# Patient Record
Sex: FEMALE | Race: WHITE | NOT HISPANIC OR LATINO | Employment: FULL TIME | URBAN - METROPOLITAN AREA
[De-identification: names, ages, dates, MRNs, and addresses within clinical notes are randomized per-mention and may not be internally consistent; named-entity substitution may affect disease eponyms.]

---

## 2024-06-10 DIAGNOSIS — Z34.91 FIRST TRIMESTER PREGNANCY: Primary | ICD-10-CM

## 2024-07-01 ENCOUNTER — PROCEDURE VISIT (OUTPATIENT)
Dept: OBSTETRICS AND GYNECOLOGY | Facility: CLINIC | Age: 29
End: 2024-07-01
Payer: COMMERCIAL

## 2024-07-01 DIAGNOSIS — Z34.91 FIRST TRIMESTER PREGNANCY: ICD-10-CM

## 2024-07-01 PROCEDURE — 76801 OB US < 14 WKS SINGLE FETUS: CPT | Mod: S$GLB,,, | Performed by: STUDENT IN AN ORGANIZED HEALTH CARE EDUCATION/TRAINING PROGRAM

## 2024-07-01 RX ORDER — HYDROXYCHLOROQUINE SULFATE 200 MG/1
TABLET, FILM COATED ORAL
COMMUNITY

## 2024-07-01 RX ORDER — HYDROCHLOROTHIAZIDE 25 MG/1
TABLET ORAL
COMMUNITY

## 2024-07-01 RX ORDER — BELIMUMAB 200 MG/ML
SOLUTION SUBCUTANEOUS
COMMUNITY

## 2024-07-22 ENCOUNTER — INITIAL PRENATAL (OUTPATIENT)
Dept: OBSTETRICS AND GYNECOLOGY | Facility: CLINIC | Age: 29
End: 2024-07-22
Payer: COMMERCIAL

## 2024-07-22 VITALS — HEART RATE: 98 BPM | SYSTOLIC BLOOD PRESSURE: 125 MMHG | WEIGHT: 173 LBS | DIASTOLIC BLOOD PRESSURE: 70 MMHG

## 2024-07-22 DIAGNOSIS — Z3A.11 11 WEEKS GESTATION OF PREGNANCY: ICD-10-CM

## 2024-07-22 DIAGNOSIS — O09.91 SUPERVISION OF HIGH RISK PREGNANCY IN FIRST TRIMESTER: Primary | ICD-10-CM

## 2024-07-22 PROBLEM — M32.9 SYSTEMIC LUPUS ERYTHEMATOSUS: Status: ACTIVE | Noted: 2023-10-05

## 2024-07-22 NOTE — PROGRESS NOTES
CC: Initial OB visit    HPI:  28 y.o. at 11w0d with EDC of 2/10/2025, by 8w Ultrasound.  Complains of nothing today.    ROS:  Negative unless mentioned above    PMH: Lupus  PSH: arm surgery, dental surgery  OB Hx:   SOC: denies S/E/D   FH: FOB sister - CL, denies family history of congenital defects, mental retardation, twins, cystic fibrosis, Down's syndrome, sickle cell, NTD's, cleft palate, cardiac defects, abdominal wall defects, GYN cancers   GYN Hx: no past history STD's,  Negative History of HSV,  Negative   History of Abnormal PAP  Review of patient's allergies indicates:   Allergen Reactions    Bananas [banana]     Cashew nut     Cucumber (cucumis sativus)     Melon      Current Outpatient Medications   Medication Instructions    BENLYSTA 200 mg/mL AtIn     hydroCHLOROthiazide (HYDRODIURIL) 25 MG tablet     hydroxychloroquine (PLAQUENIL) 200 mg tablet      PHYSICAL EXAM  Prenatal Vitals  BP: 125/70  Weight: 78.5 kg (173 lb)    There is no height or weight on file to calculate BMI.    Wt Readings from Last 3 Encounters:   24 78.5 kg (173 lb)     General: NAD, well developed, well nourished  Psych: alert and oriented to person, time and place, normal affect  HEENT: normocephalic, atraumatic  Gravid, soft, NT  Skin: warm, dry  Neuro: normal gait, gross motor function intact  Pelvic: NEFG. Normal vaginal mucosa, pink/ruggated, no lesions or discharge. Cvx closed, nonfriable  No cyanosis, clubbing or edema    PNL reviewed: wnl     ASSESSMENT: Patient is a 28 y.o.  at 11w0d with  Patient Active Problem List   Diagnosis    Supervision of high risk pregnancy in first trimester    Systemic lupus erythematosus       PLAN:  NIPT today, AFP on RTC  Pt with h/o lupus sees rheumatology   PNL - reviewed, GC/CZ, PAP - utd  PNV, Iron  Pain, Fever, Bleeding Precautions  ADAN, ARIEL, PreE precautions  Encouraged Breast feeding  F/U in 4 weeks

## 2024-08-07 ENCOUNTER — PATIENT MESSAGE (OUTPATIENT)
Dept: OBSTETRICS AND GYNECOLOGY | Facility: CLINIC | Age: 29
End: 2024-08-07
Payer: COMMERCIAL

## 2024-08-19 ENCOUNTER — TELEPHONE (OUTPATIENT)
Dept: OBSTETRICS AND GYNECOLOGY | Facility: CLINIC | Age: 29
End: 2024-08-19

## 2024-08-19 ENCOUNTER — ROUTINE PRENATAL (OUTPATIENT)
Dept: OBSTETRICS AND GYNECOLOGY | Facility: CLINIC | Age: 29
End: 2024-08-19
Payer: COMMERCIAL

## 2024-08-19 VITALS — WEIGHT: 178 LBS | DIASTOLIC BLOOD PRESSURE: 77 MMHG | SYSTOLIC BLOOD PRESSURE: 136 MMHG | HEART RATE: 86 BPM

## 2024-08-19 DIAGNOSIS — Z36.1 NEED FOR MATERNAL SERUM ALPHA-PROTEIN (MSAFP) SCREENING: ICD-10-CM

## 2024-08-19 DIAGNOSIS — Z3A.15 15 WEEKS GESTATION OF PREGNANCY: ICD-10-CM

## 2024-08-19 DIAGNOSIS — O09.92 SUPERVISION OF HIGH RISK PREGNANCY IN SECOND TRIMESTER: Primary | ICD-10-CM

## 2024-08-19 NOTE — TELEPHONE ENCOUNTER
Orders have been sent over.     ----- Message from Eva Cornejo sent at 8/19/2024  2:32 PM CDT -----  Regarding: orders for bloodwork  Name of who is calling:   Myra / [facility]ology [facility]      What is the request in detail: Requesting a call back asap due to needing orders for blood work / pt is in office now / please fax to [phone]      Can the clinic reply by [facility]:      What number to call back if not [facility]:[phone]

## 2024-08-19 NOTE — PROGRESS NOTES
CC: Follow-Up OB    HPI:   28 y.o.  at 15w0d with EDC of 2/10/2025, by Ultrasound, here for her follow up OB visit. Complains of nothing today. Nausea is improving.    Pregnancy ROS:  Negative leakage of fluid   Negative vaginal bleeding   Negative headache, vision changes, RUQ pain, epigastric pain  Negative contractions/abdominal pain   Negative pelvic pressure     Physical Exam:  Prenatal Vitals  BP: 136/77  Weight: 80.7 kg (178 lb)    Wt Readings from Last 3 Encounters:   24 80.7 kg (178 lb)   24 78.5 kg (173 lb)       There is no height or weight on file to calculate BMI.    General: NAD, well developed, well nourished  Psych: alert and oriented to person, time and place, normal affect  HEENT: normocephalic, atraumatic  Abd: Gravid, soft, NT, ND  Skin: warm, dry  Neuro: normal gait, gross motor function intact  No cyanosis, clubbing or edema    FHTs: +    ASSESSMENT: 28 y.o.  @ 15w0d with   Patient Active Problem List   Diagnosis    Supervision of high risk pregnancy in first trimester    Systemic lupus erythematosus       PLAN:  NIPT negative  AFP today  Continue ASA daily  Pain, fever, bleeding precautions  ADAN, ARIEL, PreE precautions  Cont PNV, Iron  Return to clinic in 4 weeks

## 2024-08-22 LAB
AFP MOM: 1.02
AFP, SERUM: 26.7 NG/ML
CALC'D GESTATIONAL AGE: 15 WEEKS
COLLECTION DATE: NORMAL
COMMENT: NORMAL
DATE OF BIRTH: NORMAL
DONOR AGE: EGG RETRIEVAL: NORMAL
DONOR EGG: NO
EDD DETERMINED BY: NORMAL
EST'D DATE OF DELIVERY: NORMAL
HX OF NEURAL TUBE DEFECTS: NO
INSULIN DEPEND DIABETIC: NO
INTERPRETATION: NORMAL
Lab: NORMAL
MATERNAL WEIGHT: 178 LBS
MOTHER'S ETHNIC ORIGIN: NORMAL
NUMBER OF FETUSES: 1
PREV PREGNANCY DOWN SYND: NO
REPEAT SPECIMEN: NO
RISK FOR ONTD: NORMAL

## 2024-08-26 ENCOUNTER — PATIENT MESSAGE (OUTPATIENT)
Dept: OTHER | Facility: OTHER | Age: 29
End: 2024-08-26
Payer: COMMERCIAL

## 2024-09-02 ENCOUNTER — PATIENT MESSAGE (OUTPATIENT)
Dept: OTHER | Facility: OTHER | Age: 29
End: 2024-09-02
Payer: COMMERCIAL

## 2024-09-16 ENCOUNTER — ROUTINE PRENATAL (OUTPATIENT)
Dept: OBSTETRICS AND GYNECOLOGY | Facility: CLINIC | Age: 29
End: 2024-09-16
Payer: COMMERCIAL

## 2024-09-16 VITALS — SYSTOLIC BLOOD PRESSURE: 146 MMHG | HEART RATE: 84 BPM | WEIGHT: 183 LBS | DIASTOLIC BLOOD PRESSURE: 80 MMHG

## 2024-09-16 DIAGNOSIS — O16.2 ELEVATED BLOOD PRESSURE AFFECTING PREGNANCY IN SECOND TRIMESTER, ANTEPARTUM: ICD-10-CM

## 2024-09-16 DIAGNOSIS — O09.92 SUPERVISION OF HIGH RISK PREGNANCY IN SECOND TRIMESTER: Primary | ICD-10-CM

## 2024-09-16 DIAGNOSIS — Z3A.19 19 WEEKS GESTATION OF PREGNANCY: ICD-10-CM

## 2024-09-16 NOTE — PROGRESS NOTES
CC: Follow-Up OB    HPI:   28 y.o.  at 19w0d with EDC of 2/10/2025, by Ultrasound, here for her follow up OB visit. Complains of nothing today. BP was elevated in clinic today x 3.    Pregnancy ROS:  Negative leakage of fluid   Negative vaginal bleeding   Negative headache, vision changes, RUQ pain, epigastric pain  Negative contractions/abdominal pain   Negative pelvic pressure     Physical Exam:  Prenatal Vitals  BP: (!) 146/80  Weight: 83 kg (183 lb)  Urine Glucose: Negative  Urine Albumin: Negative    Wt Readings from Last 3 Encounters:   24 83 kg (183 lb)   24 80.7 kg (178 lb)   24 78.5 kg (173 lb)     There is no height or weight on file to calculate BMI.    General: NAD, well developed, well nourished  Psych: alert and oriented to person, time and place, normal affect  HEENT: normocephalic, atraumatic  Abd: Gravid, soft, NT, ND  Skin: warm, dry  Neuro: normal gait, gross motor function intact  No cyanosis, clubbing or edema    FHTs: +      ASSESSMENT: 28 y.o.  @ 19w0d with   Patient Active Problem List   Diagnosis    Supervision of high risk pregnancy in first trimester    Systemic lupus erythematosus     PLAN:  AFP negative  Continue ASA daily  Elevated BP today, will get baseline PreE labs today  Pt to return next week for BP check, if still elevated discussed needing to starts medication  Pain, fever, bleeding precautions  ADAN, ARIEL, PreE precautions  Cont PNV, Iron  Return to clinic in 1 weeks

## 2024-09-17 LAB
ABS NRBC COUNT: 0 X 10 3/UL (ref 0–0.01)
ABSOLUTE BASOPHIL: 0.06 X 10 3/UL (ref 0–0.22)
ABSOLUTE EOSINOPHIL: 0.29 X 10 3/UL (ref 0.04–0.54)
ABSOLUTE IMMATURE GRAN: 0.19 X 10 3/UL (ref 0–0.04)
ABSOLUTE LYMPHOCYTE: 1.63 X 10 3/UL (ref 0.86–4.75)
ABSOLUTE MONOCYTE: 1.03 X 10 3/UL (ref 0.22–1.08)
ALBUMIN SERPL-MCNC: 3.8 G/DL (ref 3.5–5.2)
ALBUMIN/GLOB SERPL ELPH: 1.5 {RATIO} (ref 1–2.7)
ALP ISOS SERPL LEV INH-CCNC: 48 U/L (ref 35–105)
ALT (SGPT): 20 U/L (ref 0–33)
ANION GAP SERPL CALC-SCNC: 14 MMOL/L (ref 8–17)
AST SERPL-CCNC: 22 U/L (ref 0–32)
BASOPHILS NFR BLD: 0.5 % (ref 0.2–1.2)
BILIRUBIN, TOTAL: 0.17 MG/DL (ref 0–1.2)
BLOOD GROUPING: NORMAL
BLOOD TYPE (D): POSITIVE
BUN/CREAT SERPL: 11.1 (ref 6–20)
CALCIUM SERPL-MCNC: 8.8 MG/DL (ref 8.6–10.2)
CARBON DIOXIDE, CO2: 21 MMOL/L (ref 22–29)
CHLORIDE: 104 MMOL/L (ref 98–107)
CREAT SERPL-MCNC: 0.62 MG/DL (ref 0.5–0.9)
EOSINOPHIL NFR BLD: 2.2 % (ref 0.7–7)
GFR ESTIMATION: 125.1 ML/MIN/1.73M2
GLOBULIN: 2.6 G/DL (ref 1.5–4.5)
GLUCOSE: 95 MG/DL (ref 74–106)
HCT VFR BLD AUTO: 39 % (ref 37–47)
HGB BLD-MCNC: 13.3 G/DL (ref 12–16)
IMMATURE GRANULOCYTES: 1.5 % (ref 0–0.5)
LDH SERPL L TO P-CCNC: 192 U/L (ref 135–214)
LYMPHOCYTES NFR BLD: 12.6 % (ref 19.3–53.1)
MCH RBC QN AUTO: 31 PG (ref 27–32)
MCHC RBC AUTO-ENTMCNC: 34.1 G/DL (ref 32–36)
MCV RBC AUTO: 90.9 FL (ref 82–100)
MONOCYTES NFR BLD: 8 % (ref 4.7–12.5)
NEUTROPHILS # BLD AUTO: 9.71 X 10 3/UL (ref 2.15–7.56)
NEUTROPHILS NFR BLD: 75.2 % (ref 34–71.1)
NUCLEATED RED BLOOD CELLS: 0 /100 WBC (ref 0–0.2)
PLATELET # BLD AUTO: 210 X 10 3/UL (ref 135–400)
POTASSIUM: 4.3 MMOL/L (ref 3.5–5.1)
PROT SNV-MCNC: 6.4 G/DL (ref 6.4–8.3)
RBC # BLD AUTO: 4.29 X 10 6/UL (ref 4.2–5.4)
RDW-SD: 43.5 FL (ref 37–54)
SODIUM: 139 MMOL/L (ref 136–145)
URATE SERPL-MCNC: 3.1 MG/DL (ref 2.4–5.7)
UREA NITROGEN (BUN): 6.9 MG/DL (ref 6–20)
WBC # BLD: 12.91 X 10 3/UL (ref 4.3–10.8)

## 2024-09-23 ENCOUNTER — PATIENT MESSAGE (OUTPATIENT)
Dept: OTHER | Facility: OTHER | Age: 29
End: 2024-09-23
Payer: COMMERCIAL

## 2024-09-27 ENCOUNTER — ROUTINE PRENATAL (OUTPATIENT)
Dept: OBSTETRICS AND GYNECOLOGY | Facility: CLINIC | Age: 29
End: 2024-09-27
Payer: COMMERCIAL

## 2024-09-27 VITALS — HEART RATE: 71 BPM | DIASTOLIC BLOOD PRESSURE: 84 MMHG | SYSTOLIC BLOOD PRESSURE: 140 MMHG | WEIGHT: 186 LBS

## 2024-09-27 DIAGNOSIS — Z3A.20 20 WEEKS GESTATION OF PREGNANCY: ICD-10-CM

## 2024-09-27 DIAGNOSIS — O09.92 SUPERVISION OF HIGH RISK PREGNANCY IN SECOND TRIMESTER: Primary | ICD-10-CM

## 2024-09-27 NOTE — PROGRESS NOTES
CC: Follow-Up OB    HPI:   28 y.o.  at 20w4d with EDC of 2/10/2025, by Ultrasound, here for her follow up OB visit. Complains of nothing today.    Pregnancy ROS:  Positive fetal movement   Negative leakage of fluid   Negative vaginal bleeding   Negative headache, vision changes, RUQ pain, epigastric pain  Negative contractions/abdominal pain   Negative pelvic pressure     Physical Exam:  Prenatal Vitals  BP: (!) 140/84  Weight: 84.4 kg (186 lb)  Urine Glucose: Negative  Urine Albumin: Negative    Wt Readings from Last 3 Encounters:   24 84.4 kg (186 lb)   24 83 kg (183 lb)   24 80.7 kg (178 lb)       There is no height or weight on file to calculate BMI.    General: NAD, well developed, well nourished  Psych: alert and oriented to person, time and place, normal affect  HEENT: normocephalic, atraumatic  Abd: Gravid, soft, NT, ND  Skin: warm, dry  Neuro: normal gait, gross motor function intact  No cyanosis, clubbing or edema    FHTs: +    Maternal Blood Type: O+/-    ASSESSMENT: 28 y.o.  @ 20w4d with   Patient Active Problem List   Diagnosis    Supervision of high risk pregnancy in first trimester    Systemic lupus erythematosus     PLAN:  Continue ASA daily   Pt to have anatomy scan with MFM  BP elevated again today in clinic however pt is checking her BP at home, all values wnl, no elevated readings at home  Pt likely with white coat HTN  Pain, fever, bleeding precautions  ADAN, ARIEL, PreE precautions  Cont PNV, Iron  Return to clinic in 3 weeks

## 2024-09-30 ENCOUNTER — PATIENT MESSAGE (OUTPATIENT)
Dept: OBSTETRICS AND GYNECOLOGY | Facility: CLINIC | Age: 29
End: 2024-09-30
Payer: COMMERCIAL

## 2024-09-30 DIAGNOSIS — R30.0 DYSURIA: Primary | ICD-10-CM

## 2024-10-04 LAB — URINE CULTURE, ROUTINE: NORMAL

## 2024-10-11 DIAGNOSIS — Z36.89 ENCOUNTER FOR FETAL ANATOMIC SURVEY: Primary | ICD-10-CM

## 2024-10-14 ENCOUNTER — ROUTINE PRENATAL (OUTPATIENT)
Dept: OBSTETRICS AND GYNECOLOGY | Facility: CLINIC | Age: 29
End: 2024-10-14
Payer: COMMERCIAL

## 2024-10-14 ENCOUNTER — PROCEDURE VISIT (OUTPATIENT)
Dept: OBSTETRICS AND GYNECOLOGY | Facility: CLINIC | Age: 29
End: 2024-10-14
Payer: COMMERCIAL

## 2024-10-14 VITALS — DIASTOLIC BLOOD PRESSURE: 82 MMHG | SYSTOLIC BLOOD PRESSURE: 154 MMHG | WEIGHT: 187.63 LBS | HEART RATE: 98 BPM

## 2024-10-14 DIAGNOSIS — O09.92 SUPERVISION OF HIGH RISK PREGNANCY IN SECOND TRIMESTER: Primary | ICD-10-CM

## 2024-10-14 DIAGNOSIS — Z36.89 ENCOUNTER FOR FETAL ANATOMIC SURVEY: Primary | ICD-10-CM

## 2024-10-14 DIAGNOSIS — Z3A.23 23 WEEKS GESTATION OF PREGNANCY: ICD-10-CM

## 2024-10-14 DIAGNOSIS — O09.92 HIGH-RISK PREGNANCY IN SECOND TRIMESTER: Primary | ICD-10-CM

## 2024-10-14 NOTE — PROGRESS NOTES
CC: Follow-Up OB    HPI:   28 y.o.  at 23w0d with EDC of 2/10/2025, by Ultrasound, here for her follow up OB visit. Complains of nothing today.    Pregnancy ROS:  Positive fetal movement   Negative leakage of fluid   Negative vaginal bleeding   Negative headache, vision changes, RUQ pain, epigastric pain  Negative contractions/abdominal pain   Negative pelvic pressure     Physical Exam:  Prenatal Vitals  BP: (!) 154/82  Weight: 85.1 kg (187 lb 9.6 oz)  Urine Glucose: Negative  Urine Albumin: Negative    Wt Readings from Last 3 Encounters:   10/14/24 85.1 kg (187 lb 9.6 oz)   24 84.4 kg (186 lb)   24 83 kg (183 lb)       There is no height or weight on file to calculate BMI.    General: NAD, well developed, well nourished  Psych: alert and oriented to person, time and place, normal affect  HEENT: normocephalic, atraumatic  Abd: Gravid, soft, NT, ND  Skin: warm, dry  Neuro: normal gait, gross motor function intact  No cyanosis, clubbing or edema    FHTs: + on US    Maternal Blood Type: O+/-    ASSESSMENT: 28 y.o.  @ 23w0d with   Patient Active Problem List   Diagnosis    Supervision of high risk pregnancy in first trimester    Systemic lupus erythematosus     PLAN:  Pt with white coat HTN, she did bring her BP readings from home, all values wnl, most in 120s systolic  Anatomy scan today  Osull on RTC  Pain, fever, bleeding precautions  ADAN, ARIEL, PreE precautions  Cont PNV, Iron  Return to clinic in 3 weeks

## 2024-10-21 ENCOUNTER — PATIENT MESSAGE (OUTPATIENT)
Dept: OTHER | Facility: OTHER | Age: 29
End: 2024-10-21
Payer: COMMERCIAL

## 2024-10-22 ENCOUNTER — PATIENT MESSAGE (OUTPATIENT)
Dept: OBSTETRICS AND GYNECOLOGY | Facility: CLINIC | Age: 29
End: 2024-10-22
Payer: COMMERCIAL

## 2024-10-24 ENCOUNTER — PATIENT MESSAGE (OUTPATIENT)
Dept: GASTROENTEROLOGY | Facility: CLINIC | Age: 29
End: 2024-10-24
Payer: COMMERCIAL

## 2024-10-28 ENCOUNTER — OFFICE VISIT (OUTPATIENT)
Dept: MATERNAL FETAL MEDICINE | Facility: CLINIC | Age: 29
End: 2024-10-28
Payer: COMMERCIAL

## 2024-10-28 VITALS
BODY MASS INDEX: 34.02 KG/M2 | OXYGEN SATURATION: 99 % | HEART RATE: 107 BPM | WEIGHT: 192 LBS | HEIGHT: 63 IN | RESPIRATION RATE: 20 BRPM | SYSTOLIC BLOOD PRESSURE: 142 MMHG | DIASTOLIC BLOOD PRESSURE: 76 MMHG

## 2024-10-28 DIAGNOSIS — O09.92 HIGH-RISK PREGNANCY IN SECOND TRIMESTER: ICD-10-CM

## 2024-10-28 PROCEDURE — 99214 OFFICE O/P EST MOD 30 MIN: CPT | Mod: S$PBB,,, | Performed by: OBSTETRICS & GYNECOLOGY

## 2024-10-28 PROCEDURE — 76811 OB US DETAILED SNGL FETUS: CPT | Mod: 26,S$PBB,, | Performed by: OBSTETRICS & GYNECOLOGY

## 2024-11-04 ENCOUNTER — PATIENT MESSAGE (OUTPATIENT)
Dept: OTHER | Facility: OTHER | Age: 29
End: 2024-11-04
Payer: COMMERCIAL

## 2024-11-07 ENCOUNTER — ROUTINE PRENATAL (OUTPATIENT)
Dept: OBSTETRICS AND GYNECOLOGY | Facility: CLINIC | Age: 29
End: 2024-11-07
Payer: COMMERCIAL

## 2024-11-07 ENCOUNTER — PATIENT MESSAGE (OUTPATIENT)
Dept: OBSTETRICS AND GYNECOLOGY | Facility: CLINIC | Age: 29
End: 2024-11-07

## 2024-11-07 VITALS — HEART RATE: 63 BPM | SYSTOLIC BLOOD PRESSURE: 153 MMHG | DIASTOLIC BLOOD PRESSURE: 83 MMHG

## 2024-11-07 DIAGNOSIS — I10 WHITE COAT SYNDROME WITH HYPERTENSION: ICD-10-CM

## 2024-11-07 DIAGNOSIS — Z3A.26 26 WEEKS GESTATION OF PREGNANCY: ICD-10-CM

## 2024-11-07 DIAGNOSIS — O09.92 SUPERVISION OF HIGH RISK PREGNANCY IN SECOND TRIMESTER: Primary | ICD-10-CM

## 2024-11-07 LAB — GLUCOSE 1 HR POST 50 GM: 110 MG/DL

## 2024-11-07 NOTE — PROGRESS NOTES
CC: Follow-Up OB    HPI:   29 y.o.  at 26w3d with EDC of 2/10/2025, by Ultrasound, here for her follow up OB visit. Complains of nothing today.    Pregnancy ROS:  Positive fetal movement   Negative leakage of fluid   Negative vaginal bleeding   Negative headache, vision changes, RUQ pain, epigastric pain  Negative contractions/abdominal pain   Negative pelvic pressure     Physical Exam:  Prenatal Vitals  BP: (!) 153/83  Urine Glucose: Negative  Urine Albumin: Negative    Wt Readings from Last 3 Encounters:   10/28/24 87.1 kg (192 lb)   10/14/24 85.1 kg (187 lb 9.6 oz)   24 84.4 kg (186 lb)       There is no height or weight on file to calculate BMI.    General: NAD, well developed, well nourished  Psych: alert and oriented to person, time and place, normal affect  HEENT: normocephalic, atraumatic  Abd: Gravid, soft, NT, ND  Skin: warm, dry  Neuro: normal gait, gross motor function intact  No cyanosis, clubbing or edema    FHTs: +    Maternal Blood Type: O+/-    ASSESSMENT: 29 y.o.  @ 26w3d with   Patient Active Problem List   Diagnosis    Supervision of high risk pregnancy in first trimester    Systemic lupus erythematosus    White coat syndrome with hypertension     PLAN:  BP elevated in clinic, pt is checking her BP at home with normal reading  Will scan home BP reading in epic   Pt denies PreE symptoms  Continue ASA daily   Osull today  Will order PreE labs on RTC  Pain, fever, bleeding precautions  ADAN, ARIEL, PreE precautions  Cont PNV, Iron  Return to clinic in 3 weeks

## 2024-11-18 ENCOUNTER — PATIENT MESSAGE (OUTPATIENT)
Dept: OTHER | Facility: OTHER | Age: 29
End: 2024-11-18
Payer: COMMERCIAL

## 2024-11-18 ENCOUNTER — PATIENT MESSAGE (OUTPATIENT)
Dept: OBSTETRICS AND GYNECOLOGY | Facility: CLINIC | Age: 29
End: 2024-11-18
Payer: COMMERCIAL

## 2024-11-25 ENCOUNTER — TELEPHONE (OUTPATIENT)
Dept: OBSTETRICS AND GYNECOLOGY | Facility: CLINIC | Age: 29
End: 2024-11-25

## 2024-11-25 ENCOUNTER — ROUTINE PRENATAL (OUTPATIENT)
Dept: OBSTETRICS AND GYNECOLOGY | Facility: CLINIC | Age: 29
End: 2024-11-25
Payer: COMMERCIAL

## 2024-11-25 VITALS
HEART RATE: 98 BPM | BODY MASS INDEX: 35.22 KG/M2 | WEIGHT: 198.81 LBS | DIASTOLIC BLOOD PRESSURE: 80 MMHG | SYSTOLIC BLOOD PRESSURE: 150 MMHG

## 2024-11-25 DIAGNOSIS — O09.93 SUPERVISION OF HIGH RISK PREGNANCY IN THIRD TRIMESTER: Primary | ICD-10-CM

## 2024-11-25 DIAGNOSIS — Z3A.29 29 WEEKS GESTATION OF PREGNANCY: Primary | ICD-10-CM

## 2024-11-25 DIAGNOSIS — Z3A.29 29 WEEKS GESTATION OF PREGNANCY: ICD-10-CM

## 2024-11-25 LAB
ABS NRBC COUNT: 0 X 10 3/UL (ref 0–0.01)
ABSOLUTE BASOPHIL: 0.13 X 10 3/UL (ref 0–0.22)
ABSOLUTE EOSINOPHIL: 0.25 X 10 3/UL (ref 0.04–0.54)
ABSOLUTE IMMATURE GRAN: 0.57 X 10 3/UL (ref 0–0.04)
ABSOLUTE LYMPHOCYTE: 1.57 X 10 3/UL (ref 0.86–4.75)
ABSOLUTE MONOCYTE: 1.2 X 10 3/UL (ref 0.22–1.08)
BASOPHILS NFR BLD: 0.8 % (ref 0.2–1.2)
EOSINOPHIL NFR BLD: 1.6 % (ref 0.7–7)
HCT VFR BLD AUTO: 38.8 % (ref 37–47)
HGB BLD-MCNC: 13.4 G/DL (ref 12–16)
HIV 1+2 AB+HIV1 P24 AG SERPL QL IA: NONREACTIVE
IMMATURE GRANULOCYTES: 3.6 % (ref 0–0.5)
LYMPHOCYTES NFR BLD: 10 % (ref 19.3–53.1)
MCH RBC QN AUTO: 32 PG (ref 27–32)
MCHC RBC AUTO-ENTMCNC: 34.5 G/DL (ref 32–36)
MCV RBC AUTO: 92.6 FL (ref 82–100)
MONOCYTES NFR BLD: 7.7 % (ref 4.7–12.5)
NEUTROPHILS # BLD AUTO: 11.94 X 10 3/UL (ref 2.15–7.56)
NEUTROPHILS NFR BLD: 76.3 % (ref 34–71.1)
NUCLEATED RED BLOOD CELLS: 0 /100 WBC (ref 0–0.2)
PLATELET # BLD AUTO: 207 X 10 3/UL (ref 135–400)
RBC # BLD AUTO: 4.19 X 10 6/UL (ref 4.2–5.4)
RDW-SD: 45.1 FL (ref 37–54)
SYPHILIS TREPONEMAL ANTIBODY: NONREACTIVE
WBC # BLD: 15.66 X 10 3/UL (ref 4.3–10.8)

## 2024-11-25 NOTE — TELEPHONE ENCOUNTER
----- Message from Jolene sent at 11/25/2024  2:12 PM CST -----  Contact: jacqueline  - path lab  Type: Staff Message     Who called: [facility]  Call back number: [phone]  Reason for the call: need order (pt there now)  Additional information: fax : [phone]

## 2024-11-25 NOTE — PROGRESS NOTES
CC: Follow-Up OB    HPI:   29 y.o.  at 29w0d with EDC of 2/10/2025, by Ultrasound, here for her follow up OB visit. Complains of nothing today. She does have her BP log today, all values wnl, denies PreE ROS.    Pregnancy ROS:  Positive fetal movement   Negative leakage of fluid   Negative vaginal bleeding   Negative headache, vision changes, RUQ pain, epigastric pain  Negative contractions/abdominal pain   Negative pelvic pressure     Physical Exam:  Prenatal Vitals  BP: (!) 150/80  Weight: 90.2 kg (198 lb 12.8 oz)    Wt Readings from Last 3 Encounters:   24 90.2 kg (198 lb 12.8 oz)   10/28/24 87.1 kg (192 lb)   10/14/24 85.1 kg (187 lb 9.6 oz)       Body mass index is 35.22 kg/m².    General: NAD, well developed, well nourished  Psych: alert and oriented to person, time and place, normal affect  HEENT: normocephalic, atraumatic  Abd: Gravid, soft, NT, ND  Skin: warm, dry  Neuro: normal gait, gross motor function intact  No cyanosis, clubbing or edema    FHTs: +    Maternal Blood Type: O+/-    ASSESSMENT: 29 y.o.  @ 29w0d with   Patient Active Problem List   Diagnosis    Supervision of high risk pregnancy in first trimester    Systemic lupus erythematosus    White coat syndrome with hypertension     PLAN:  Continue ASA daily   Osull wnl  3rd trimester labs today, tdap declined  Pain, fever, bleeding precautions  ADAN, ARIEL, PreE precautions  Cont PNV, Iron  Return to clinic in 3 weeks

## 2024-12-02 ENCOUNTER — PATIENT MESSAGE (OUTPATIENT)
Dept: OTHER | Facility: OTHER | Age: 29
End: 2024-12-02
Payer: COMMERCIAL

## 2024-12-05 DIAGNOSIS — O09.93 HIGH-RISK PREGNANCY IN THIRD TRIMESTER: Primary | ICD-10-CM

## 2024-12-16 ENCOUNTER — PATIENT MESSAGE (OUTPATIENT)
Dept: OBSTETRICS AND GYNECOLOGY | Facility: CLINIC | Age: 29
End: 2024-12-16

## 2024-12-16 ENCOUNTER — PATIENT MESSAGE (OUTPATIENT)
Dept: OTHER | Facility: OTHER | Age: 29
End: 2024-12-16
Payer: COMMERCIAL

## 2024-12-16 ENCOUNTER — ROUTINE PRENATAL (OUTPATIENT)
Dept: OBSTETRICS AND GYNECOLOGY | Facility: CLINIC | Age: 29
End: 2024-12-16
Payer: COMMERCIAL

## 2024-12-16 VITALS
WEIGHT: 204 LBS | SYSTOLIC BLOOD PRESSURE: 142 MMHG | DIASTOLIC BLOOD PRESSURE: 74 MMHG | HEART RATE: 103 BPM | BODY MASS INDEX: 36.14 KG/M2

## 2024-12-16 DIAGNOSIS — Z3A.32 32 WEEKS GESTATION OF PREGNANCY: ICD-10-CM

## 2024-12-16 DIAGNOSIS — O09.93 SUPERVISION OF HIGH RISK PREGNANCY IN THIRD TRIMESTER: Primary | ICD-10-CM

## 2024-12-16 NOTE — PROGRESS NOTES
CC: Follow-Up OB    HPI:   29 y.o.  at 32w0d with EDC of 2/10/2025, by Ultrasound, here for her follow up OB visit. Complains of nothing today.    Pregnancy ROS:  Positive fetal movement   Negative leakage of fluid   Negative vaginal bleeding   Negative headache, vision changes, RUQ pain, epigastric pain  Negative contractions/abdominal pain   Negative pelvic pressure     Physical Exam:  Prenatal Vitals  BP: (!) 142/74  Weight: 92.5 kg (204 lb)  Urine Glucose: Negative  Urine Albumin: Negative    Wt Readings from Last 3 Encounters:   24 92.5 kg (204 lb)   24 90.2 kg (198 lb 12.8 oz)   10/28/24 87.1 kg (192 lb)     Body mass index is 36.14 kg/m².    General: NAD, well developed, well nourished  Psych: alert and oriented to person, time and place, normal affect  HEENT: normocephalic, atraumatic  Abd: Gravid, soft, NT, ND  Skin: warm, dry  Neuro: normal gait, gross motor function intact  No cyanosis, clubbing or edema    FHTs: +    Maternal Blood Type: O+/-    ASSESSMENT: 29 y.o.  @ 32w0d with   Patient Active Problem List   Diagnosis    Supervision of high risk pregnancy in first trimester    Systemic lupus erythematosus    White coat syndrome with hypertension     PLAN:  Continue ASA daily   3rd trimester labs wnl  Growth scan with [facility]  BP log reviewed, no elevated values  Pain, fever, bleeding precautions  ADAN, ARIEL, PreE precautions  Cont PNV, Iron  Return to clinic in 2 weeks

## 2024-12-17 ENCOUNTER — PATIENT MESSAGE (OUTPATIENT)
Dept: OBSTETRICS AND GYNECOLOGY | Facility: CLINIC | Age: 29
End: 2024-12-17
Payer: COMMERCIAL

## 2024-12-19 ENCOUNTER — PROCEDURE VISIT (OUTPATIENT)
Dept: MATERNAL FETAL MEDICINE | Facility: CLINIC | Age: 29
End: 2024-12-19
Payer: COMMERCIAL

## 2024-12-19 VITALS
SYSTOLIC BLOOD PRESSURE: 120 MMHG | OXYGEN SATURATION: 98 % | WEIGHT: 201 LBS | HEART RATE: 119 BPM | BODY MASS INDEX: 35.61 KG/M2 | RESPIRATION RATE: 20 BRPM | DIASTOLIC BLOOD PRESSURE: 60 MMHG

## 2024-12-19 DIAGNOSIS — O35.EXX0 FETAL RENAL ANOMALY, SINGLE GESTATION: Primary | ICD-10-CM

## 2024-12-19 DIAGNOSIS — O09.93 HIGH-RISK PREGNANCY IN THIRD TRIMESTER: ICD-10-CM

## 2024-12-19 NOTE — PROGRESS NOTES
Follow up Maternal Fetal Medicine Consultation  Indication for consultation:  Intrauterine pregnancy at 32w3d  Systemic Lupus Erythematosus   Fetal Bilateral Urinary Tract Dilation    Provider requesting consultation: Juancho Maradiaga MD    Dear Dr. Maradiaga,    Thank you very much for your referral of Marilee Crisostomo who was seen for follow up perinatology consultation and sonography today.  As you recall she is a 29 y.o.  at 32w3d with an Estimated Date of Delivery: 2/10/25.  She reports that she has passed her GTT and that her blood pressures are overall doing well and reporting them to your office.       Review of systems: The patient denies any vaginal bleeding, loss of fluid or contraction pain today.  /60   Pulse (!) 119   Resp 20   Wt 91.2 kg (201 lb)   SpO2 98%   BMI 35.61 kg/m²      Physical exam:  Gen: WDWN in NAD  HEENT: WNL  Abdomen: Soft, non-tender, non-distended   Skin: No rash or jaundice  Extremities: Symmetric in size, no clubbing, cyanosis, or edema  Neuro: Grossly intact    Ultrasound:  Single intrauterine pregnancy measuring 33 weeks and 1 days with an estimated fetal weight of 2133g.  This is consistent with a previously determined BALTA and is at the 37th percentile.  The fetus is in the vertex presentation.  The placenta is anterior.  The amniotic fluid is normal. There is bilateral urinary tract dilation with central involvement only measuring 7mm on the right and 9mm on the left.  The remainder of the anatomical survey was performed and there are no structural anomalies or aneuploidy on ultrasound today.  Additionally, a BPP was performed that was .  She has undergone genetic screening with NIPT that was low risk.       Counseling:  I discussed with her today the bilateral urinary tract dilation.  This is more common in male .  She has undergone NIPT testing that was low risk.  I did let her know that as the pregnancy continues that this can resolve however can also  increase and so therefore serial evaluation is indicated.  A follow up appointment was made here in 4 weeks.  If it remains present at that time will need to have  follow up with ultrasound    Assessment:  Intrauterine pregnancy at 32w3d  Bilateral fetal urinary tract dilation      Recommendations:   Marilee was instructed to keep all of her upcoming appointments with you and with maternal fetal medicine.  With your permission we will see her back in 4 weeks.  Bilateral UTD - mild.  Again both of the values are less than a centimeter and would not anticipate it cause the  any issues.  We will have her follow up one additional time.      Thanks once again for allowing us to participate in the care of your patients.  If you have any questions about today's consultation feel free to contact me or my partners at [phone].    Sincerely,    Carmen Calero, DO  Maternal-Fetal Medicine

## 2024-12-19 NOTE — PROGRESS NOTES
Marilee is here for followup [facility] consultation for maternal SLE, referred by Dr. Maradiaga.    She is feeling fetal movement.    Marilee denies vaginal bleeding, loss of fluid, recurrent contractions; she denies any Lupus flair.    She takes Plaquenil 400 mg PO daily and ASA 81 mg PO daily.    Vitals:    12/19/24 1158   BP: 120/60   Pulse: (!) 119   Resp: 20   SpO2: 98%   Weight: 91.2 kg (201 lb)     BMI:                    35.61 kg/m^2

## 2024-12-30 ENCOUNTER — ROUTINE PRENATAL (OUTPATIENT)
Dept: OBSTETRICS AND GYNECOLOGY | Facility: CLINIC | Age: 29
End: 2024-12-30
Payer: COMMERCIAL

## 2024-12-30 VITALS
DIASTOLIC BLOOD PRESSURE: 83 MMHG | SYSTOLIC BLOOD PRESSURE: 136 MMHG | WEIGHT: 205 LBS | BODY MASS INDEX: 36.31 KG/M2 | HEART RATE: 101 BPM

## 2024-12-30 DIAGNOSIS — O09.93 SUPERVISION OF HIGH RISK PREGNANCY IN THIRD TRIMESTER: Primary | ICD-10-CM

## 2024-12-30 DIAGNOSIS — Z3A.34 34 WEEKS GESTATION OF PREGNANCY: ICD-10-CM

## 2024-12-30 NOTE — PROGRESS NOTES
CC: Follow-Up OB    HPI:   29 y.o.  at 34w0d with EDC of 2/10/2025, by Ultrasound, here for her follow up OB visit. Complains of LE swelling.    Pregnancy ROS:  Positive fetal movement   Negative leakage of fluid   Negative vaginal bleeding   Negative headache, vision changes, RUQ pain, epigastric pain  Negative contractions/abdominal pain   Negative pelvic pressure     Physical Exam:  Prenatal Vitals  BP: 136/83  Weight: 93 kg (205 lb)  Urine Glucose: Negative  Urine Albumin: Negative    Wt Readings from Last 3 Encounters:   24 93 kg (205 lb)   24 91.2 kg (201 lb)   24 92.5 kg (204 lb)       Body mass index is 36.31 kg/m².    General: NAD, well developed, well nourished  Psych: alert and oriented to person, time and place, normal affect  HEENT: normocephalic, atraumatic  Abd: Gravid, soft, NT, ND  Skin: warm, dry  Neuro: normal gait, gross motor function intact  No cyanosis, clubbing or edema    FHTs: +    Maternal Blood Type: O+/-    ASSESSMENT: 29 y.o.  @ 34w0d with   Patient Active Problem List   Diagnosis    Supervision of high risk pregnancy in first trimester    Systemic lupus erythematosus    White coat syndrome with hypertension       PLAN:  Continue ASA daily   Pain, fever, bleeding precautions  ADAN, ARIEL, PreE precautions  Cont PNV, Iron  Return to clinic in 1 weeks

## 2025-01-06 ENCOUNTER — PATIENT MESSAGE (OUTPATIENT)
Dept: OTHER | Facility: OTHER | Age: 30
End: 2025-01-06
Payer: COMMERCIAL

## 2025-01-06 DIAGNOSIS — O09.93 HIGH-RISK PREGNANCY IN THIRD TRIMESTER: Primary | ICD-10-CM

## 2025-01-07 ENCOUNTER — PATIENT MESSAGE (OUTPATIENT)
Dept: OBSTETRICS AND GYNECOLOGY | Facility: CLINIC | Age: 30
End: 2025-01-07
Payer: COMMERCIAL

## 2025-01-13 NOTE — PROGRESS NOTES
Follow-up MFM Consultation  Referring provider: Dr. Maradiaga    Indications for referral:  1) Pregnancy at 36w3d (with an Estimated Date of Delivery: 2/10/25)  2) Bilateral fetal upper urinary tract dilation  3) Systemic Lupus Erythrematosis    Dear Dr. Maradiaga,  Thank you for your kind request for consultation and imaging of your patient at the [facility] at [facility].  There have been no changes in her history since her last visit here. She has no complaints today.     Physical Exam  Vital signs: There were no vitals taken for this visit.  General: Age appearing female in no apparent distress.  ABDOMEN:  Gravid, soft, nontender  Uterus: Nontender, appropriate height for gestational age    ULTRASOUND FINDINGS:  A repeat ultrasound was performed. The fetus is cephalic. EFW of 3038gm (6lb 11oz) is at the 50th percentile.  The placenta is anterior.  Amniotic fluid is normal. There are no fetal structural malformations to extent of view. Please see accompanying detailed [facility] report.    IMPRESSION:     1) 36+3wga  2) Bilateral upper urinary tract dilation  3) Systemic Lupus Erythematosis  4) Reassuring fetal growth and anatomy    RECOMMENDATIONS/DISCUSSION:  Mild fetal renal pelvis dilation is again seen.  Given the normal amniotic fluid level, and the lack of severe dilation or dysplastic changes, I feel the prognosis for this fetus is good.  There is a good chance that this may resolve spontaneously after birth.  I do not feel this finding is an indication for early delivery, and a decision on how and when to deliver her should be based upon usual obstetrical indications.  This patient has lupus but this is in remission at this time.  No further Maternal-Fetal Medicine evaluation should be necessary.   Would inform the pediatricians of our findings about the fetal kidneys so that this can be evaluated after birth.      Thank you for allowing us to participate in her care.  Please do  not hesitate to call with questions.  For any questions feel free to call our oncall MFM 24/7 at [phone].  -Jj Baptiste MD

## 2025-01-16 ENCOUNTER — PATIENT MESSAGE (OUTPATIENT)
Dept: OBSTETRICS AND GYNECOLOGY | Facility: CLINIC | Age: 30
End: 2025-01-16

## 2025-01-16 ENCOUNTER — ROUTINE PRENATAL (OUTPATIENT)
Dept: OBSTETRICS AND GYNECOLOGY | Facility: CLINIC | Age: 30
End: 2025-01-16
Payer: COMMERCIAL

## 2025-01-16 ENCOUNTER — CLINICAL SUPPORT (OUTPATIENT)
Dept: MATERNAL FETAL MEDICINE | Facility: CLINIC | Age: 30
End: 2025-01-16
Payer: COMMERCIAL

## 2025-01-16 VITALS
WEIGHT: 209 LBS | RESPIRATION RATE: 20 BRPM | OXYGEN SATURATION: 97 % | SYSTOLIC BLOOD PRESSURE: 132 MMHG | BODY MASS INDEX: 37.02 KG/M2 | DIASTOLIC BLOOD PRESSURE: 66 MMHG | HEART RATE: 101 BPM

## 2025-01-16 VITALS
SYSTOLIC BLOOD PRESSURE: 152 MMHG | DIASTOLIC BLOOD PRESSURE: 73 MMHG | BODY MASS INDEX: 37.24 KG/M2 | HEART RATE: 99 BPM | WEIGHT: 210.19 LBS

## 2025-01-16 DIAGNOSIS — O09.93 HIGH-RISK PREGNANCY IN THIRD TRIMESTER: ICD-10-CM

## 2025-01-16 DIAGNOSIS — Z3A.36 36 WEEKS GESTATION OF PREGNANCY: ICD-10-CM

## 2025-01-16 DIAGNOSIS — O35.EXX0 FETAL RENAL ANOMALY, SINGLE GESTATION: Primary | ICD-10-CM

## 2025-01-16 DIAGNOSIS — O09.93 SUPERVISION OF HIGH RISK PREGNANCY IN THIRD TRIMESTER: Primary | ICD-10-CM

## 2025-01-16 PROCEDURE — 0502F SUBSEQUENT PRENATAL CARE: CPT | Mod: S$PBB,,, | Performed by: STUDENT IN AN ORGANIZED HEALTH CARE EDUCATION/TRAINING PROGRAM

## 2025-01-16 NOTE — PROGRESS NOTES
CC: Follow-Up OB    HPI:   29 y.o.  at 36w3d with EDC of 2/10/2025, by Ultrasound, here for her follow up OB visit. Complains of nothing today.    Pregnancy ROS:  Positive fetal movement   Negative leakage of fluid   Negative vaginal bleeding   Negative headache, vision changes, RUQ pain, epigastric pain  Negative contractions/abdominal pain   Negative pelvic pressure     Physical Exam:  Prenatal Vitals  BP: (!) 152/73  Weight: 95.3 kg (210 lb 3.2 oz)    Wt Readings from Last 3 Encounters:   25 95.3 kg (210 lb 3.2 oz)   25 94.8 kg (209 lb)   24 93 kg (205 lb)       Body mass index is 37.24 kg/m².    General: NAD, well developed, well nourished  Psych: alert and oriented to person, time and place, normal affect  HEENT: normocephalic, atraumatic  Abd: Gravid, soft, NT, ND  Skin: warm, dry  Neuro: normal gait, gross motor function intact  Pelvic: NEFG. Cvx cl/25/hi  No cyanosis, clubbing or edema    FHTs: + on US    Maternal Blood Type: O+/-    ASSESSMENT: 29 y.o.  @ 36w3d with   Patient Active Problem List   Diagnosis    High-risk pregnancy in third trimester    Systemic lupus erythematosus    White coat syndrome with hypertension    Fetal renal anomaly, single gestation     PLAN:  Pt declines GBS  Continue ASA daily  Pt seen by MFM Today, note reviewed  At home BP log reviewed  Pain, fever, bleeding precautions  ADAN, ARIEL, PreE precautions  Cont PNV, Iron  Return to clinic in 1 weeks

## 2025-01-16 NOTE — PROGRESS NOTES
Marilee is here for followup [facility] consultation for SLE and fetal bilateral urinary tract dilation, referred by Dr. Maradiaga. She denies any symptoms of lupus flair.    She is feeling fetal movement.    Marilee denies vaginal bleeding, loss of fluid, recurrent contractions.    She is taking ASA 81 mg PO daily and Plaquenil 400 mg daily.    Vitals:    01/16/25 1256   BP: 132/66   Pulse: 101   Resp: 20   SpO2: 97%   Weight: 94.8 kg (209 lb)     BMI:                    37.02 kg/m^2

## 2025-01-22 ENCOUNTER — PATIENT MESSAGE (OUTPATIENT)
Dept: OBSTETRICS AND GYNECOLOGY | Facility: CLINIC | Age: 30
End: 2025-01-22

## 2025-01-23 ENCOUNTER — PATIENT MESSAGE (OUTPATIENT)
Dept: OBSTETRICS AND GYNECOLOGY | Facility: CLINIC | Age: 30
End: 2025-01-23

## 2025-01-23 ENCOUNTER — ROUTINE PRENATAL (OUTPATIENT)
Dept: OBSTETRICS AND GYNECOLOGY | Facility: CLINIC | Age: 30
End: 2025-01-23
Payer: COMMERCIAL

## 2025-01-23 VITALS
SYSTOLIC BLOOD PRESSURE: 135 MMHG | DIASTOLIC BLOOD PRESSURE: 88 MMHG | HEART RATE: 81 BPM | WEIGHT: 212 LBS | BODY MASS INDEX: 37.55 KG/M2

## 2025-01-23 DIAGNOSIS — Z3A.37 37 WEEKS GESTATION OF PREGNANCY: ICD-10-CM

## 2025-01-23 DIAGNOSIS — O09.93 SUPERVISION OF HIGH RISK PREGNANCY IN THIRD TRIMESTER: Primary | ICD-10-CM

## 2025-01-23 PROCEDURE — 0502F SUBSEQUENT PRENATAL CARE: CPT | Mod: S$PBB,,, | Performed by: STUDENT IN AN ORGANIZED HEALTH CARE EDUCATION/TRAINING PROGRAM

## 2025-01-23 NOTE — PROGRESS NOTES
CC: Follow-Up OB    HPI:   29 y.o.  at 37w3d with EDC of 2/10/2025, by Ultrasound, here for her follow up OB visit. Complains of nothing today.    Pregnancy ROS:  Positive fetal movement   Negative leakage of fluid   Negative vaginal bleeding   Negative headache, vision changes, RUQ pain, epigastric pain  Negative contractions/abdominal pain   Negative pelvic pressure     Physical Exam:  Prenatal Vitals  BP: 135/88  Weight: 96.2 kg (212 lb)  Urine Glucose: Negative  Urine Albumin: Negative    Wt Readings from Last 3 Encounters:   25 96.2 kg (212 lb)   25 95.3 kg (210 lb 3.2 oz)   25 94.8 kg (209 lb)       Body mass index is 37.55 kg/m².    General: NAD, well developed, well nourished  Psych: alert and oriented to person, time and place, normal affect  HEENT: normocephalic, atraumatic  Abd: Gravid, soft, NT, ND  Skin: warm, dry  Neuro: normal gait, gross motor function intact  Pelvic: NEFG. Cvx -/hi  No cyanosis, clubbing or edema    FHTs: +    Maternal Blood Type: O+/-    ASSESSMENT: 29 y.o.  @ 37w3d with   Patient Active Problem List   Diagnosis    High-risk pregnancy in third trimester    Systemic lupus erythematosus    White coat syndrome with hypertension    Fetal renal anomaly, single gestation       PLAN:  Continue ASA daily   Pain, fever, bleeding precautions  ADAN, ARIEL, PreE precautions  Cont PNV, Iron  Return to clinic in 1 weeks

## 2025-01-27 ENCOUNTER — PATIENT MESSAGE (OUTPATIENT)
Dept: OBSTETRICS AND GYNECOLOGY | Facility: CLINIC | Age: 30
End: 2025-01-27

## 2025-01-27 ENCOUNTER — ROUTINE PRENATAL (OUTPATIENT)
Dept: OBSTETRICS AND GYNECOLOGY | Facility: CLINIC | Age: 30
End: 2025-01-27
Payer: COMMERCIAL

## 2025-01-27 VITALS — SYSTOLIC BLOOD PRESSURE: 133 MMHG | BODY MASS INDEX: 37.2 KG/M2 | DIASTOLIC BLOOD PRESSURE: 88 MMHG | WEIGHT: 210 LBS

## 2025-01-27 DIAGNOSIS — O09.93 SUPERVISION OF HIGH RISK PREGNANCY IN THIRD TRIMESTER: Primary | ICD-10-CM

## 2025-01-27 DIAGNOSIS — Z3A.38 38 WEEKS GESTATION OF PREGNANCY: ICD-10-CM

## 2025-01-27 PROCEDURE — 0502F SUBSEQUENT PRENATAL CARE: CPT | Mod: S$PBB,,, | Performed by: STUDENT IN AN ORGANIZED HEALTH CARE EDUCATION/TRAINING PROGRAM

## 2025-01-27 NOTE — PROGRESS NOTES
CC: Follow-Up OB    HPI:   29 y.o.  at 38w0d with EDC of 2/10/2025, by Ultrasound, here for her follow up OB visit. Complains of nothing today.    Pregnancy ROS:  Positive fetal movement   Negative leakage of fluid   Negative vaginal bleeding   Negative headache, vision changes, RUQ pain, epigastric pain  Negative contractions/abdominal pain   Negative pelvic pressure     Physical Exam:  Prenatal Vitals  BP: 133/88  Weight: 95.3 kg (210 lb)    Wt Readings from Last 3 Encounters:   25 95.3 kg (210 lb)   25 96.2 kg (212 lb)   25 95.3 kg (210 lb 3.2 oz)     Body mass index is 37.2 kg/m².    General: NAD, well developed, well nourished  Psych: alert and oriented to person, time and place, normal affect  HEENT: normocephalic, atraumatic  Abd: Gravid, soft, NT, ND  Skin: warm, dry  Neuro: normal gait, gross motor function intact  Pelvic: NEFG. Cvx 1-/hi  No cyanosis, clubbing or edema    FHTs: +    Maternal Blood Type: O+/-    ASSESSMENT: 29 y.o.  @ 38w0d with   Patient Active Problem List   Diagnosis    High-risk pregnancy in third trimester    Systemic lupus erythematosus    White coat syndrome with hypertension    Fetal renal anomaly, single gestation       PLAN:  Continue ASA daily   Pain, fever, bleeding precautions  ADAN, ARIEL, PreE precautions  Cont PNV, Iron  Return to clinic in 1 weeks

## 2025-02-03 ENCOUNTER — ROUTINE PRENATAL (OUTPATIENT)
Dept: OBSTETRICS AND GYNECOLOGY | Facility: CLINIC | Age: 30
End: 2025-02-03
Payer: COMMERCIAL

## 2025-02-03 VITALS
WEIGHT: 212.19 LBS | HEART RATE: 88 BPM | DIASTOLIC BLOOD PRESSURE: 89 MMHG | SYSTOLIC BLOOD PRESSURE: 152 MMHG | BODY MASS INDEX: 37.59 KG/M2

## 2025-02-03 DIAGNOSIS — O09.93 SUPERVISION OF HIGH RISK PREGNANCY IN THIRD TRIMESTER: Primary | ICD-10-CM

## 2025-02-03 DIAGNOSIS — Z3A.39 39 WEEKS GESTATION OF PREGNANCY: ICD-10-CM

## 2025-02-03 PROCEDURE — 0502F SUBSEQUENT PRENATAL CARE: CPT | Mod: S$PBB,,, | Performed by: STUDENT IN AN ORGANIZED HEALTH CARE EDUCATION/TRAINING PROGRAM

## 2025-02-03 NOTE — PROGRESS NOTES
CC: Follow-Up OB    HPI:   29 y.o.  at 39w0d with EDC of 2/10/2025, by Ultrasound, here for her follow up OB visit. Complains of nothing today.    Pregnancy ROS:  Positive fetal movement   Negative leakage of fluid   Negative vaginal bleeding   Negative headache, vision changes, RUQ pain, epigastric pain  Negative contractions/abdominal pain   Negative pelvic pressure     Physical Exam:  Prenatal Vitals  BP: (!) 152/89  Weight: 96.3 kg (212 lb 3.2 oz)  Urine Glucose: Negative  Urine Albumin: Negative    Wt Readings from Last 3 Encounters:   25 96.3 kg (212 lb 3.2 oz)   25 95.3 kg (210 lb)   25 96.2 kg (212 lb)       Body mass index is 37.59 kg/m².    General: NAD, well developed, well nourished  Psych: alert and oriented to person, time and place, normal affect  HEENT: normocephalic, atraumatic  Abd: Gravid, soft, NT, ND  Skin: warm, dry  Neuro: normal gait, gross motor function intact  Pelvic: NEFG. Cvx /hi  No cyanosis, clubbing or edema    FHTs: +    Maternal Blood Type: O+/-    ASSESSMENT: 29 y.o.  @ 39w0d with   Patient Active Problem List   Diagnosis    High-risk pregnancy in third trimester    Systemic lupus erythematosus    White coat syndrome with hypertension    Fetal renal anomaly, single gestation       PLAN:  Continue ASA daily   Pt to send her home BP readings today   Pain, fever, bleeding precautions  ADAN, ARIEL, PreE precautions  Cont PNV, Iron  Return to clinic in 1 weeks

## 2025-02-10 LAB
A1 MICROGLOB PLACENTAL VAG QL: ABNORMAL
APPEARANCE, UA: ABNORMAL
BILIRUB UR QL STRIP: NEGATIVE MG/DL
COLOR UR: ABNORMAL
GLUCOSE (UA): NORMAL MG/DL
HGB UR QL STRIP: 10 /UL
KETONES UR QL STRIP: NEGATIVE MG/DL
LEUKOCYTE ESTERASE UR QL STRIP: NEGATIVE /UL
NITRITE UR QL STRIP: NEGATIVE
PH UR STRIP: 7 PH (ref 5–8)
PROT UR QL STRIP: NEGATIVE MG/DL
SP GR UR STRIP: 1.01 (ref 1–1.03)
SPECIMEN COLLECTION METHOD, URINE: ABNORMAL
UROBILINOGEN UR STRIP-ACNC: NORMAL MG/DL

## 2025-02-11 ENCOUNTER — OUTSIDE PLACE OF SERVICE (OUTPATIENT)
Dept: OBSTETRICS AND GYNECOLOGY | Facility: CLINIC | Age: 30
End: 2025-02-11
Payer: COMMERCIAL

## 2025-02-24 ENCOUNTER — PATIENT MESSAGE (OUTPATIENT)
Dept: OBSTETRICS AND GYNECOLOGY | Facility: CLINIC | Age: 30
End: 2025-02-24
Payer: COMMERCIAL

## 2025-03-14 ENCOUNTER — PATIENT MESSAGE (OUTPATIENT)
Dept: OBSTETRICS AND GYNECOLOGY | Facility: CLINIC | Age: 30
End: 2025-03-14
Payer: COMMERCIAL

## 2025-03-20 ENCOUNTER — POSTPARTUM VISIT (OUTPATIENT)
Dept: OBSTETRICS AND GYNECOLOGY | Facility: CLINIC | Age: 30
End: 2025-03-20
Payer: COMMERCIAL

## 2025-03-20 VITALS
HEART RATE: 99 BPM | WEIGHT: 188.38 LBS | HEIGHT: 63 IN | DIASTOLIC BLOOD PRESSURE: 79 MMHG | BODY MASS INDEX: 33.38 KG/M2 | SYSTOLIC BLOOD PRESSURE: 145 MMHG

## 2025-03-20 PROBLEM — O35.EXX0 FETAL RENAL ANOMALY, SINGLE GESTATION: Status: RESOLVED | Noted: 2025-01-16 | Resolved: 2025-03-20

## 2025-03-20 PROBLEM — O09.93 HIGH-RISK PREGNANCY IN THIRD TRIMESTER: Status: RESOLVED | Noted: 2024-07-22 | Resolved: 2025-03-20

## 2025-03-20 NOTE — PROGRESS NOTES
"Patient is a 29-year-old white female status post vaginal delivery present clinic for 6 week postpartum visit.  Reports she is doing well today however reports a possible vaginal issue, she is concerned about possibly having a stitch still in place from her vaginal laceration repair.  She has breastfeeding without difficulty.  She has not had a cycle since delivery.  She declines contraception today.  She has no other complaints at this time.  She denies VB/VD/dysuria/Denies any HA, vision changes, F/C, LE swelling. Denies any breast pain/soreness. Denies postpartum depression.     Vitals:    03/20/25 1341   BP: (!) 145/79   Pulse: 99   Weight: 85.5 kg (188 lb 6.4 oz)   Height: 5' 3" (1.6 m)     Body mass index is 33.37 kg/m².  Gen:WDWN in NAD  NC/AT  Non labored breathing  Abd soft, NT/ND  Pelvic NFEG no lesions no discharge  Vaginal walls pink moist well rugated, at the introitus patient does appear to have some vaginal scarring, see procedure note below  Skin: warm and dry  Ext no edema    Procedure:   Vaginal introitus with 1 cm area scar in the midline, area had lidocaine gel applied and then lidocaine with epinephrine injected.  The area was transected using scissors, minimal bleeding appreciated.  The edges were closed with 3-0 chromic.  Hemostasis was noted following procedure.  Patient tolerated well.    Problem List[1]    Plan   Patient doing well postpartum   Area vaginal scarring corrected today in clinic, procedure note above.    Patient to continue postpartum restrictions for the next 2-3 weeks to allow for healing  We discussed contraception, she declines today  RTC 1 yr annual /PRN           [1]   Patient Active Problem List  Diagnosis    High-risk pregnancy in third trimester    Systemic lupus erythematosus    White coat syndrome with hypertension    Fetal renal anomaly, single gestation     "